# Patient Record
Sex: FEMALE | Race: WHITE | NOT HISPANIC OR LATINO | URBAN - METROPOLITAN AREA
[De-identification: names, ages, dates, MRNs, and addresses within clinical notes are randomized per-mention and may not be internally consistent; named-entity substitution may affect disease eponyms.]

---

## 2020-06-19 NOTE — PATIENT DISCUSSION
POSTERIOR VITREOUS DETACHMENT, OD: NO RETINAL HOLES OR TEARS ON SLIT LAMP EXAM.  RD PRECAUTIONS DISCUSSED. CALLBACK INSTRUCTIONS GIVEN. RETURN FOR FOLLOW-UP AS SCHEDULED.

## 2020-06-19 NOTE — PATIENT DISCUSSION
K SICCA OU: NO IMPROVEMENT WITH PRESERVATIVE FREE ARTIFICIAL TEARS 4-6X A DAY, OU  ADD NIGHTLY LUBRICATING OINTMENT OR GEL. CONTINUE RESTASIS QDAY. INCREASE TO BID IF SYMPTOMS WORSEN.

## 2020-06-19 NOTE — PATIENT DISCUSSION
Continue: Restasis MultiDose (cyclosporine): drops: 0.05% 1 drop twice a day as directed into both eyes

## 2021-05-25 NOTE — PATIENT DISCUSSION
Stopped Today: Restasis MultiDose (cyclosporine): drops: 0.05% 1 drop twice a day as directed into both eyes

## 2021-05-25 NOTE — PATIENT DISCUSSION
K SICCA OU: NO IMPROVEMENT WITH PRESERVATIVE FREE ARTIFICIAL TEARS 4-6X A DAY, OU ADD NIGHTLY LUBRICATING OINTMENT OR GEL. IMPROVED ON RESTASIS BUT NOT COVERED WELL WITH INSURANCE. SWITCHED TO CEQUA BID OU. SAMPLES GIVEN TO USE UNTIL COVERAGE IS DETERMINED.

## 2021-08-03 NOTE — PATIENT DISCUSSION
General: Patient last assessed:  7/28/2021 to manage: GOC/AD, symptoms, and support.  30 Minutes; Start: 0830am  End: 0900am  , of non-face-to-face prolonged service provided that relates to (face-to-face) care that has or will occur and ongoing patient management, including one or more of the following:   - Reviewed records from other physicians or other health care professional services, including one or more of the following: other medical records and diagnostic / radiology study results

## 2021-10-12 ENCOUNTER — NEW PATIENT PROBLEM FOCUSED (OUTPATIENT)
Dept: URBAN - METROPOLITAN AREA CLINIC 49 | Facility: CLINIC | Age: 46
End: 2021-10-12

## 2021-10-12 DIAGNOSIS — S05.01XA: ICD-10-CM

## 2021-10-12 PROCEDURE — 92002 INTRM OPH EXAM NEW PATIENT: CPT

## 2021-10-12 ASSESSMENT — TONOMETRY
OD_IOP_MMHG: 16
OS_IOP_MMHG: 16

## 2021-10-12 ASSESSMENT — VISUAL ACUITY
OU_SC: J1+
OS_SC: 20/20
OD_SC: J1+
OS_SC: J1+
OD_SC: 20/20

## 2021-12-20 NOTE — PATIENT DISCUSSION
New Prescription: Samantha Weems (cyclosporine): dropperette: 0.09% 1 drop twice a day into both eyes 05-.

## 2021-12-20 NOTE — PATIENT DISCUSSION
Stopped Today: Restasis MultiDose (cyclosporine): drops: 0.05% 1 drop twice a day as directed into both eyes.

## 2022-09-08 ENCOUNTER — COMPREHENSIVE EXAM (OUTPATIENT)
Dept: URBAN - METROPOLITAN AREA CLINIC 48 | Facility: LOCATION | Age: 47
End: 2022-09-08

## 2022-09-08 DIAGNOSIS — H35.361: ICD-10-CM

## 2022-09-08 PROCEDURE — 92014 COMPRE OPH EXAM EST PT 1/>: CPT

## 2022-09-08 ASSESSMENT — KERATOMETRY
OS_K1POWER_DIOPTERS: 43.75
OD_AXISANGLE_DEGREES: 5
OD_K1POWER_DIOPTERS: 44.25
OS_K2POWER_DIOPTERS: 44.75
OD_K2POWER_DIOPTERS: 45.00
OS_AXISANGLE2_DEGREES: 80
OD_AXISANGLE2_DEGREES: 95
OS_AXISANGLE_DEGREES: 170

## 2022-09-08 ASSESSMENT — TONOMETRY
OS_IOP_MMHG: 15
OD_IOP_MMHG: 14

## 2022-09-08 ASSESSMENT — VISUAL ACUITY
OD_SC: 20/25-1/+1
OU_SC: J1+ @16IN
OS_SC: 20/20-2

## 2023-04-24 NOTE — PATIENT DISCUSSION
Continue Restasis BID OU. appeals letter sent.
GLAUCOMA SUSPECT, OU : INCREASED CUP/DISC RATIO.
HYDROXYCHLOROQUINE (PLAQUENIL) USE: NO OCULAR TOXICITY OU. CONTINUE PLAQUENIL AS DIRECTED. SCHEDULE YEARLY VF 10-2 RED,OCT MACULA, AND ASSESMENT OF COLOR PLATES WITH DILATED FUNDUS EXAM. KEEP FOLLOW UP AS SCHEDULED.
Discharged